# Patient Record
Sex: MALE | Race: WHITE | NOT HISPANIC OR LATINO | Employment: FULL TIME | URBAN - METROPOLITAN AREA
[De-identification: names, ages, dates, MRNs, and addresses within clinical notes are randomized per-mention and may not be internally consistent; named-entity substitution may affect disease eponyms.]

---

## 2023-11-03 ENCOUNTER — OFFICE VISIT (OUTPATIENT)
Dept: SURGERY | Facility: CLINIC | Age: 43
End: 2023-11-03
Payer: COMMERCIAL

## 2023-11-03 DIAGNOSIS — M79.89 MASS OF SOFT TISSUE OF RIGHT LOWER EXTREMITY: ICD-10-CM

## 2023-11-03 DIAGNOSIS — M79.89 PARASPINAL SOFT TISSUE MASS: Primary | ICD-10-CM

## 2023-11-03 PROBLEM — L72.9 SKIN CYST: Status: ACTIVE | Noted: 2023-11-03

## 2023-11-03 PROBLEM — S01.20XA: Status: ACTIVE | Noted: 2023-11-03

## 2023-11-03 PROBLEM — D17.1 LIPOMA OF BACK: Status: ACTIVE | Noted: 2023-11-03

## 2023-11-03 PROCEDURE — 1036F TOBACCO NON-USER: CPT | Performed by: PHYSICIAN ASSISTANT

## 2023-11-03 PROCEDURE — 99203 OFFICE O/P NEW LOW 30 MIN: CPT | Performed by: PHYSICIAN ASSISTANT

## 2023-11-03 RX ORDER — ACETAMINOPHEN 500 MG
TABLET ORAL
COMMUNITY

## 2023-11-03 RX ORDER — TESTOSTERONE 20.25 MG/1.25G
GEL TOPICAL
COMMUNITY
Start: 2023-10-16

## 2023-11-03 RX ORDER — PEDI MULTIVIT NO.16 W-FLUORIDE 0.25 MG
TABLET,CHEWABLE ORAL
COMMUNITY
Start: 2019-12-20

## 2023-11-03 RX ORDER — BLOOD-GLUCOSE METER
EACH MISCELLANEOUS EVERY MORNING
COMMUNITY
Start: 2023-06-09

## 2023-11-03 RX ORDER — ATENOLOL 25 MG/1
25 TABLET ORAL
COMMUNITY
Start: 2019-09-12

## 2023-11-03 NOTE — PROGRESS NOTES
Subjective   Patient ID: Félix Pack is a 43 y.o. male who presents for New Patient Visit (Lipoma on back).    HPI  Is a 43-year-old gentleman who comes in sent by family practice for a mass in his right upper back region that he had an ultrasound on it is unclear whether muscle and spare paraspinous tissue was involved.  Patient is not clear if he wants to have it removed.  But he would like to have further imaging because it is causing him discomfort.  He is concerned because the mass has been getting larger.  He also complains about a cystic mass on his right lower extremity shin area.        Review of Systems  Negative other than mentioned in HPI    ENT: No earache, no sore throat, no nosebleeds  Cardiovascular: No chest pain, no shortness of breath, no leg pain, no edema  Respiratory: No shortness of breath on exertion, no wheezing  Gastrointestinal: No abdominal pain, no melena, no nausea, vomiting and/or diarrhea  Musculoskeletal: No pain moving all extremities, no back pain ambulating normally  Has a large soft tissue mass upper back, small cystic mass right lower extremity  Skin: No rashes, no lesions, and no skin changes  Neuro: No headache, no confusion, no numbness and tingling  Psychiatric, normal mood, not suicidal, not homicidal, feeling good        Physical Exam  Eyes: Conjunctiva non -icteric and eye lids are without obvious rash or drooping. Pupils are symmetric.   Ears, Nose, Mouth, and Throat: External ears and nose appear to be without deformity or rash. No lesions or masses noted. Hearing is grossly intact.   Neck:. No JVD noted, tracheal position is midline. No thyromegaly, no thyroid nodules  Head and Face: Examination of the head and face revealed no abnormalities.   Respiratory: No gasping or shortness of breath noted, no use of accessory muscles noted. Clear to auscultate bilaterally  Cardiovascular: Examination for edema is normal. Regular rate and rhythm S1 S2 without murmurs  GI: Abdomen  non tender to palpation, bowel sounds present no hepatosplenomegaly  Skin: 7 x 6 cm soft tissue mass right paraspinous upper back region not very mobile not well defined, some tenderness.  Cystic mass tiny 1 cm right lower extremity  Musk: Digits/nails show no clubbing or cyanosis. No asymmetry or masses noted of the musculature. Examination of the muscles/joints/bones show normal range of motion. Gait is grossly normally.   Neurologic: Cranial nerves II- XII intact, motor strength 5/5 muscle strength of the lower extremities bilaterally and equal.      Objective     No diagnosis found.   Patient Active Problem List   Diagnosis    Lipoma of back    Open wound of nose, complicated    Skin cyst      Allergies   Allergen Reactions    Sulfamethoxazole Unknown      Medication Documentation Review Audit       Reviewed by Lucrecia Ruvalcaba MA (Medical Assistant) on 11/03/23 at 1008      Medication Order Taking? Sig Documenting Provider Last Dose Status   atenolol (Tenormin) 25 mg tablet [id number] No Take 1 tablet (25 mg) by mouth. Historical Provider, MD Not Taking Active   omega-3 (Fish OiL) 300-1,000 mg capsule [id number] Yes Take by mouth. Historical Provider, MD Taking Active   OneTouch Verio test strips strip [id number] Yes once daily in the morning. Historical Provider, MD Taking Active   pedi multivit no.17 w-fluoride (Multivitamins With Fluoride) 0.25 mg tablet,chewable [id number] Yes Chew. Historical Provider, MD Taking Active   testosterone 20.25 mg/1.25 gram (1.62 %) gel in metered-dose pump [id number] Yes APPLY 3 PUMPS TOPICALLY ONCE DAILY AS DIRECTED Historical Provider, MD Taking Active                    History reviewed. No pertinent past medical history.  Social History     Tobacco Use   Smoking Status Never   Smokeless Tobacco Never     No family history on file.   History reviewed. No pertinent surgical history.    Assessment/Plan   We will obtain a CAT scan of the thoracic spine and back upper back region to  assess the size of the mass where it is attached to what it is attached to and so forth prior to any removal.  Patient should get his CAT scan and then return to the office for further discussion      Encounter Diagnoses   Name Primary?    Paraspinal soft tissue mass Yes    Mass of soft tissue of right lower extremity        I have reviewed all data including labs,radiologic and previous reports.        **Portions of this medical record have been created using voice recognition software and may have minor errors which are inherent in voice recognition systems. It has not been fully edited for typographical or grammatical errors**

## 2023-11-06 ENCOUNTER — APPOINTMENT (OUTPATIENT)
Dept: SURGERY | Facility: CLINIC | Age: 43
End: 2023-11-06
Payer: COMMERCIAL

## 2023-12-04 DIAGNOSIS — D17.1 LIPOMA OF BACK: ICD-10-CM

## 2023-12-20 DIAGNOSIS — M79.89 PARASPINAL SOFT TISSUE MASS: ICD-10-CM
